# Patient Record
Sex: FEMALE | Race: WHITE | Employment: OTHER | URBAN - METROPOLITAN AREA
[De-identification: names, ages, dates, MRNs, and addresses within clinical notes are randomized per-mention and may not be internally consistent; named-entity substitution may affect disease eponyms.]

---

## 2020-05-05 ENCOUNTER — APPOINTMENT (OUTPATIENT)
Dept: GENERAL RADIOLOGY | Age: 45
End: 2020-05-05
Payer: COMMERCIAL

## 2020-05-05 ENCOUNTER — HOSPITAL ENCOUNTER (EMERGENCY)
Age: 45
Discharge: HOME | End: 2020-05-05
Attending: EMERGENCY MEDICINE
Payer: COMMERCIAL

## 2020-05-05 ENCOUNTER — APPOINTMENT (OUTPATIENT)
Dept: ULTRASOUND IMAGING | Age: 45
End: 2020-05-05
Payer: COMMERCIAL

## 2020-05-05 VITALS
BODY MASS INDEX: 46.01 KG/M2 | SYSTOLIC BLOOD PRESSURE: 135 MMHG | HEIGHT: 62 IN | TEMPERATURE: 98.4 F | WEIGHT: 250 LBS | DIASTOLIC BLOOD PRESSURE: 73 MMHG | HEART RATE: 90 BPM | OXYGEN SATURATION: 97 % | RESPIRATION RATE: 16 BRPM

## 2020-05-05 PROCEDURE — 73552 X-RAY EXAM OF FEMUR 2/>: CPT

## 2020-05-05 PROCEDURE — 93971 EXTREMITY STUDY: CPT

## 2020-05-05 PROCEDURE — 99284 EMERGENCY DEPT VISIT MOD MDM: CPT

## 2020-05-05 RX ORDER — ESCITALOPRAM OXALATE 20 MG/1
30 TABLET ORAL DAILY
COMMUNITY

## 2020-05-05 RX ORDER — CLONIDINE HYDROCHLORIDE 0.1 MG/1
0.1 TABLET ORAL 2 TIMES DAILY
COMMUNITY

## 2020-05-05 RX ORDER — PRAZOSIN HYDROCHLORIDE 5 MG/1
15 CAPSULE ORAL NIGHTLY
COMMUNITY

## 2020-05-05 RX ORDER — DIAZEPAM 5 MG/1
10 TABLET ORAL 3 TIMES DAILY
COMMUNITY
End: 2021-03-12 | Stop reason: ALTCHOICE

## 2020-05-05 RX ORDER — LAMOTRIGINE 200 MG/1
400 TABLET ORAL 2 TIMES DAILY
COMMUNITY

## 2020-05-05 RX ORDER — DOXEPIN HYDROCHLORIDE 100 MG/1
200 CAPSULE ORAL NIGHTLY
COMMUNITY

## 2020-05-05 ASSESSMENT — PAIN DESCRIPTION - LOCATION: LOCATION: LEG

## 2020-05-05 ASSESSMENT — PAIN SCALES - GENERAL: PAINLEVEL_OUTOF10: 8

## 2020-05-05 ASSESSMENT — PAIN DESCRIPTION - PAIN TYPE: TYPE: ACUTE PAIN

## 2020-05-05 ASSESSMENT — PAIN DESCRIPTION - ORIENTATION: ORIENTATION: RIGHT

## 2020-05-22 ENCOUNTER — HOSPITAL ENCOUNTER (EMERGENCY)
Age: 45
Discharge: HOME | End: 2020-05-22
Attending: EMERGENCY MEDICINE
Payer: COMMERCIAL

## 2020-05-22 VITALS
BODY MASS INDEX: 45.73 KG/M2 | RESPIRATION RATE: 18 BRPM | HEART RATE: 74 BPM | TEMPERATURE: 98 F | WEIGHT: 250 LBS | OXYGEN SATURATION: 98 % | DIASTOLIC BLOOD PRESSURE: 70 MMHG | SYSTOLIC BLOOD PRESSURE: 142 MMHG

## 2020-05-22 PROCEDURE — 99283 EMERGENCY DEPT VISIT LOW MDM: CPT

## 2020-05-22 PROCEDURE — 96372 THER/PROPH/DIAG INJ SC/IM: CPT

## 2020-05-22 PROCEDURE — 6360000002 HC RX W HCPCS: Performed by: STUDENT IN AN ORGANIZED HEALTH CARE EDUCATION/TRAINING PROGRAM

## 2020-05-22 RX ORDER — ORPHENADRINE CITRATE 30 MG/ML
60 INJECTION INTRAMUSCULAR; INTRAVENOUS ONCE
Status: COMPLETED | OUTPATIENT
Start: 2020-05-22 | End: 2020-05-22

## 2020-05-22 RX ADMIN — ORPHENADRINE CITRATE 60 MG: 30 INJECTION, SOLUTION INTRAMUSCULAR; INTRAVENOUS at 15:44

## 2020-05-23 ASSESSMENT — ENCOUNTER SYMPTOMS
VOMITING: 0
COUGH: 0
FACIAL SWELLING: 0
CHEST TIGHTNESS: 0
SHORTNESS OF BREATH: 0
BACK PAIN: 1
COLOR CHANGE: 0
NAUSEA: 0
PHOTOPHOBIA: 0
ABDOMINAL PAIN: 0
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
## 2020-09-12 ENCOUNTER — HOSPITAL ENCOUNTER (EMERGENCY)
Age: 45
Discharge: HOME | End: 2020-09-12
Attending: EMERGENCY MEDICINE
Payer: COMMERCIAL

## 2020-09-12 VITALS
TEMPERATURE: 97.7 F | RESPIRATION RATE: 16 BRPM | WEIGHT: 220 LBS | SYSTOLIC BLOOD PRESSURE: 130 MMHG | BODY MASS INDEX: 38.98 KG/M2 | DIASTOLIC BLOOD PRESSURE: 90 MMHG | HEART RATE: 99 BPM | HEIGHT: 63 IN | OXYGEN SATURATION: 98 %

## 2020-09-12 PROBLEM — J32.1 CHRONIC FRONTAL SINUSITIS: Status: ACTIVE | Noted: 2020-09-12

## 2020-09-12 PROBLEM — J02.9 ACUTE PHARYNGITIS: Status: ACTIVE | Noted: 2020-09-12

## 2020-09-12 PROCEDURE — 99284 EMERGENCY DEPT VISIT MOD MDM: CPT

## 2020-09-12 PROCEDURE — 99283 EMERGENCY DEPT VISIT LOW MDM: CPT

## 2020-09-12 PROCEDURE — 6370000000 HC RX 637 (ALT 250 FOR IP): Performed by: EMERGENCY MEDICINE

## 2020-09-12 RX ORDER — AMOXICILLIN AND CLAVULANATE POTASSIUM 875; 125 MG/1; MG/1
1 TABLET, FILM COATED ORAL 2 TIMES DAILY
Qty: 14 TABLET | Refills: 0 | Status: SHIPPED | OUTPATIENT
Start: 2020-09-12 | End: 2020-09-19

## 2020-09-12 RX ORDER — BACLOFEN 20 MG/1
20 TABLET ORAL PRN
COMMUNITY
End: 2021-10-19

## 2020-09-12 RX ORDER — AMOXICILLIN AND CLAVULANATE POTASSIUM 875; 125 MG/1; MG/1
1 TABLET, FILM COATED ORAL ONCE
Status: COMPLETED | OUTPATIENT
Start: 2020-09-12 | End: 2020-09-12

## 2020-09-12 RX ORDER — HYDROXYZINE 50 MG/1
50 TABLET, FILM COATED ORAL 3 TIMES DAILY PRN
COMMUNITY

## 2020-09-12 RX ADMIN — AMOXICILLIN AND CLAVULANATE POTASSIUM 1 TABLET: 875; 125 TABLET, FILM COATED ORAL at 18:58

## 2020-09-12 ASSESSMENT — PAIN DESCRIPTION - FREQUENCY: FREQUENCY: CONTINUOUS

## 2020-09-12 ASSESSMENT — ENCOUNTER SYMPTOMS
SORE THROAT: 1
BLOOD IN STOOL: 0
SINUS PAIN: 1
SINUS PRESSURE: 1
VOMITING: 0
NAUSEA: 0
BACK PAIN: 0
COUGH: 0
ABDOMINAL PAIN: 0
SHORTNESS OF BREATH: 0

## 2020-09-12 ASSESSMENT — PAIN DESCRIPTION - PROGRESSION: CLINICAL_PROGRESSION: GRADUALLY WORSENING

## 2020-09-12 ASSESSMENT — PAIN DESCRIPTION - LOCATION: LOCATION: THROAT

## 2020-09-12 ASSESSMENT — PAIN DESCRIPTION - DESCRIPTORS: DESCRIPTORS: ACHING

## 2020-09-12 ASSESSMENT — PAIN SCALES - GENERAL: PAINLEVEL_OUTOF10: 5

## 2020-09-12 ASSESSMENT — PAIN DESCRIPTION - PAIN TYPE: TYPE: ACUTE PAIN

## 2020-09-12 NOTE — ED PROVIDER NOTES
Sore throat and sinus pain today no vomiting no diarrhea drinking fluids urinating normally not pregnant no abdominal pain no stiff neck    The history is provided by the patient. Review of Systems   Constitutional: Negative for chills and fever. HENT: Positive for sinus pressure, sinus pain and sore throat. Respiratory: Negative for cough and shortness of breath. Cardiovascular: Negative for chest pain. Gastrointestinal: Negative for abdominal pain, blood in stool, nausea and vomiting. Genitourinary: Negative for dysuria and frequency. Musculoskeletal: Negative for back pain. Skin: Negative for rash. Neurological: Negative for weakness and headaches. All other systems reviewed and are negative. Physical Exam  Vitals signs and nursing note reviewed. Constitutional:       Appearance: She is well-developed. HENT:      Head: Normocephalic and atraumatic. Eyes:      Pupils: Pupils are equal, round, and reactive to light. Neck:      Musculoskeletal: Normal range of motion and neck supple. Cardiovascular:      Rate and Rhythm: Normal rate and regular rhythm. Pulmonary:      Effort: Pulmonary effort is normal. No respiratory distress. Breath sounds: Normal breath sounds. No wheezing or rales. Abdominal:      General: Bowel sounds are normal.      Palpations: Abdomen is soft. Tenderness: There is no abdominal tenderness. There is no guarding or rebound. Skin:     General: Skin is warm and dry. Neurological:      Mental Status: She is alert and oriented to person, place, and time. Cranial Nerves: No cranial nerve deficit.       Coordination: Coordination normal.     pop over sinuses    Procedures    [facility]        --------------------------------------------- PAST HISTORY ---------------------------------------------  Past Medical History:  has a past medical history of Anxiety, Bipolar 1 disorder ([address] Preeclampsia, and PTSD (post-traumatic stress disorder). Past Surgical History:  has a past surgical history that includes other surgical history. Social History:  reports that she has never smoked. She has never used smokeless tobacco. She reports current alcohol use. She reports that she does not use drugs. Family History: family history is not on file. The patients home medications have been reviewed. Allergies: Prednisone    -------------------------------------------------- RESULTS -------------------------------------------------  No results found for this visit on 09/12/20. No orders to display       ------------------------- NURSING NOTES AND VITALS REVIEWED ---------------------------   The nursing notes within the ED encounter and vital signs as below have been reviewed. BP (!) 130/90   Pulse 99   Temp 97.7 °F (36.5 °C) (Temporal)   Resp 16   Ht 5' 3\" (1.6 m)   Wt 220 lb (99.8 kg)   SpO2 98%   BMI 38.97 kg/m²   Oxygen Saturation Interpretation: Normal      ------------------------------------------ PROGRESS NOTES ------------------------------------------   I have spoken with the patient and discussed todays results, in addition to providing specific details for the plan of care and counseling regarding the diagnosis and prognosis. Their questions are answered at this time and they are agreeable with the plan.      --------------------------------- ADDITIONAL PROVIDER NOTES ---------------------------------          This patient is stable for discharge. I have shared the specific conditions for return, as well as the importance of follow-up.           --------------------------------- IMPRESSION AND DISPOSITION ---------------------------------    IMPRESSION  1. Acute pharyngitis, unspecified etiology    2. Chronic frontal sinusitis        DISPOSITION  Disposition: Discharge to home  Patient condition is stable        Labs      Radiology      EKG Interpretation.           Vanessa Yusuf MD  09/12/20 9077

## 2021-03-12 ENCOUNTER — HOSPITAL ENCOUNTER (EMERGENCY)
Age: 46
Discharge: HOME | End: 2021-03-12
Attending: EMERGENCY MEDICINE
Payer: COMMERCIAL

## 2021-03-12 VITALS
HEIGHT: 63 IN | HEART RATE: 87 BPM | SYSTOLIC BLOOD PRESSURE: 134 MMHG | RESPIRATION RATE: 16 BRPM | TEMPERATURE: 96.8 F | OXYGEN SATURATION: 99 % | DIASTOLIC BLOOD PRESSURE: 85 MMHG | BODY MASS INDEX: 38.98 KG/M2 | WEIGHT: 220 LBS
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
## 2021-03-12 PROCEDURE — 99283 EMERGENCY DEPT VISIT LOW MDM: CPT

## 2021-03-12 PROCEDURE — 6370000000 HC RX 637 (ALT 250 FOR IP): Performed by: EMERGENCY MEDICINE

## 2021-03-12 RX ORDER — DIAPER,BRIEF,INFANT-TODD,DISP
EACH MISCELLANEOUS ONCE
Status: COMPLETED | OUTPATIENT
Start: 2021-03-12 | End: 2021-03-12

## 2021-03-12 RX ORDER — CLONAZEPAM 2 MG/1
2 TABLET, ORALLY DISINTEGRATING ORAL 4 TIMES DAILY
COMMUNITY
End: 2022-08-03

## 2021-03-12 RX ORDER — DOXYCYCLINE HYCLATE 100 MG/1
100 CAPSULE ORAL ONCE
Status: COMPLETED | OUTPATIENT
Start: 2021-03-12 | End: 2021-03-12

## 2021-03-12 RX ORDER — MUPIROCIN CALCIUM 20 MG/G
CREAM TOPICAL
Qty: 15 G | Refills: 0 | Status: SHIPPED | OUTPATIENT
Start: 2021-03-12 | End: 2021-04-11

## 2021-03-12 RX ORDER — DOXYCYCLINE HYCLATE 100 MG
100 TABLET ORAL 2 TIMES DAILY
Qty: 14 TABLET | Refills: 0 | Status: SHIPPED | OUTPATIENT
Start: 2021-03-12 | End: 2021-03-19

## 2021-03-12 RX ADMIN — BACITRACIN ZINC: 500 OINTMENT TOPICAL at 22:29

## 2021-03-12 RX ADMIN — DOXYCYCLINE HYCLATE 100 MG: 100 CAPSULE ORAL at 22:28

## 2021-03-12 ASSESSMENT — PAIN DESCRIPTION - FREQUENCY: FREQUENCY: CONTINUOUS

## 2021-03-12 ASSESSMENT — PAIN DESCRIPTION - ORIENTATION: ORIENTATION: LEFT

## 2021-03-13 NOTE — ED PROVIDER NOTES
HPI:  3/13/21,   Time: 4:05 AM ZHANNA Cabrera is a 39 y.o. female presenting to the ED for insect/tick bite left breast area, beginning prior to arrival ago. The complaint has been persistent, moderate in severity, and worsened by nothing.  removed the tick. Puncture wound is noted in the left breast area. No fever no chills. ROS:   Pertinent positives and negatives are stated within HPI, all other systems reviewed and are negative.  --------------------------------------------- PAST HISTORY ---------------------------------------------  Past Medical History:  has a past medical history of Anxiety, Bipolar 1 disorder ([address] Preeclampsia, and PTSD (post-traumatic stress disorder). Past Surgical History:  has a past surgical history that includes other surgical history. Social History:  reports that she has never smoked. She has never used smokeless tobacco. She reports current alcohol use. She reports that she does not use drugs. Family History: family history is not on file. The patients home medications have been reviewed. Allergies: Prednisone    -------------------------------------------------- RESULTS -------------------------------------------------  All laboratory and radiology results have been personally reviewed by myself   LABS:  No results found for this visit on 03/12/21. RADIOLOGY:  Interpreted by Radiologist.  No orders to display       ------------------------- NURSING NOTES AND VITALS REVIEWED ---------------------------   The nursing notes within the ED encounter and vital signs as below have been reviewed.    /85   Pulse 87   Temp 96.8 °F (36 °C) (Temporal)   Resp 16   Ht 5' 3\" (1.6 m)   Wt 220 lb (99.8 kg)   SpO2 99%   BMI 38.97 kg/m²   Oxygen Saturation Interpretation: Normal      ---------------------------------------------------PHYSICAL EXAM--------------------------------------      Constitutional/General: Alert and oriented x3, well appearing, non toxic in NAD  Head: NC/AT  Eyes: PERRL, EOMI  Mouth: Oropharynx clear, handling secretions, no trismus  Neck: Supple, full ROM, no meningeal signs  Pulmonary: Lungs clear to auscultation bilaterally, no wheezes, rales, or rhonchi. Not in respiratory distress  Cardiovascular:  Regular rate and rhythm, no murmurs, gallops, or rubs. 2+ distal pulses    Chest shows small puncture wound noted just outside the areola and the position of approximately 11:00. There is slight redness there there is a puncture wound noted. There is no insect or foreign body noted there at this time. There is no drainage. There is some slight redness. Abdomen: Soft, non tender, non distended,   Extremities: Moves all extremities x 4. Warm and well perfused  Skin: warm and dry without rash  Neurologic: GCS 15,  Psych: Normal Affect      ------------------------------ ED COURSE/MEDICAL DECISION MAKING----------------------  Medications   doxycycline hyclate (VIBRAMYCIN) capsule 100 mg (100 mg Oral Given 3/12/21 2228)   bacitracin zinc ointment ( Topical Given 3/12/21 2229)         Medical Decision Making:    Exam with a chaperone and discharge with prophylaxis for Lyme disease, doxycycline. Counseling: The emergency provider has spoken with the patient and discussed todays results, in addition to providing specific details for the plan of care and counseling regarding the diagnosis and prognosis. Questions are answered at this time and they are agreeable with the plan.      --------------------------------- IMPRESSION AND DISPOSITION ---------------------------------    IMPRESSION  1.  Tick bite, initial encounter        DISPOSITION  Disposition: Discharge to home  Patient condition is fair                  Gerry Dickey MD  03/13/21 9897

## 2021-03-13 NOTE — ED NOTES
Redness noted near left nipple. Tender to palpation. No drainage noted.       Noa Chicas RN  03/12/21 0298

## 2021-03-13 NOTE — ED NOTES
Pt states bitten on left breast area-  DAWOOD Calero deferred assessment.   To follow with pmd  rx x2 given     Manuela Doll RN  03/12/21 7147

## 2021-10-19 ENCOUNTER — HOSPITAL ENCOUNTER (EMERGENCY)
Age: 46
Discharge: HOME | End: 2021-10-19
Attending: EMERGENCY MEDICINE
Payer: MEDICARE

## 2021-10-19 ENCOUNTER — APPOINTMENT (OUTPATIENT)
Dept: GENERAL RADIOLOGY | Age: 46
End: 2021-10-19
Payer: MEDICARE

## 2021-10-19 VITALS
OXYGEN SATURATION: 98 % | WEIGHT: 230 LBS | HEART RATE: 83 BPM | DIASTOLIC BLOOD PRESSURE: 90 MMHG | BODY MASS INDEX: 40.75 KG/M2 | SYSTOLIC BLOOD PRESSURE: 130 MMHG | HEIGHT: 63 IN | TEMPERATURE: 97.4 F | RESPIRATION RATE: 18 BRPM

## 2021-10-19 DIAGNOSIS — S39.012A STRAIN OF LUMBAR REGION, INITIAL ENCOUNTER: Primary | ICD-10-CM

## 2021-10-19 DIAGNOSIS — M62.830 LUMBAR PARASPINAL MUSCLE SPASM: ICD-10-CM

## 2021-10-19 DIAGNOSIS — M47.816 SPONDYLOSIS OF LUMBAR REGION WITHOUT MYELOPATHY OR RADICULOPATHY: ICD-10-CM

## 2021-10-19 PROCEDURE — 72100 X-RAY EXAM L-S SPINE 2/3 VWS: CPT

## 2021-10-19 PROCEDURE — 99283 EMERGENCY DEPT VISIT LOW MDM: CPT

## 2021-10-19 PROCEDURE — 6360000002 HC RX W HCPCS: Performed by: EMERGENCY MEDICINE

## 2021-10-19 PROCEDURE — 96372 THER/PROPH/DIAG INJ SC/IM: CPT

## 2021-10-19 RX ORDER — CYCLOBENZAPRINE HCL 10 MG
10 TABLET ORAL 2 TIMES DAILY PRN
Qty: 20 TABLET | Refills: 0 | Status: SHIPPED | OUTPATIENT
Start: 2021-10-19 | End: 2022-05-19

## 2021-10-19 RX ORDER — KETOROLAC TROMETHAMINE 30 MG/ML
30 INJECTION, SOLUTION INTRAMUSCULAR; INTRAVENOUS ONCE
Status: COMPLETED | OUTPATIENT
Start: 2021-10-19 | End: 2021-10-19

## 2021-10-19 RX ORDER — IBUPROFEN 800 MG/1
800 TABLET ORAL EVERY 8 HOURS PRN
Qty: 12 TABLET | Refills: 0 | Status: SHIPPED | OUTPATIENT
Start: 2021-10-19 | End: 2022-02-08

## 2021-10-19 RX ORDER — TRIAMCINOLONE ACETONIDE 40 MG/ML
40 INJECTION, SUSPENSION INTRA-ARTICULAR; INTRAMUSCULAR ONCE
Status: COMPLETED | OUTPATIENT
Start: 2021-10-19 | End: 2021-10-19

## 2021-10-19 RX ORDER — METHYLPREDNISOLONE 4 MG/1
TABLET ORAL
Qty: 1 KIT | Refills: 0 | Status: SHIPPED | OUTPATIENT
Start: 2021-10-19 | End: 2021-10-25

## 2021-10-19 RX ADMIN — KETOROLAC TROMETHAMINE 30 MG: 30 INJECTION, SOLUTION INTRAMUSCULAR at 08:40

## 2021-10-19 RX ADMIN — TRIAMCINOLONE ACETONIDE 40 MG: 40 INJECTION, SUSPENSION INTRA-ARTICULAR; INTRAMUSCULAR at 08:40

## 2021-10-19 ASSESSMENT — PAIN DESCRIPTION - LOCATION: LOCATION: BACK

## 2021-10-19 ASSESSMENT — PAIN DESCRIPTION - DESCRIPTORS: DESCRIPTORS: CONSTANT

## 2021-10-19 ASSESSMENT — PAIN SCALES - GENERAL
PAINLEVEL_OUTOF10: 10
PAINLEVEL_OUTOF10: 7

## 2021-10-19 ASSESSMENT — PAIN DESCRIPTION - FREQUENCY: FREQUENCY: CONTINUOUS

## 2021-10-19 NOTE — ED NOTES
Radiology Procedure Waiver   Name: Demetrius Landers  : 1975  MRN: [id number]    Date:  10/19/21    Time: 8:38 AM EDT    Benefits of immediately proceeding with Radiology exam(s) without pre-testing outweigh the risks or are not indicated as specified below and therefore the following is/are being waived:    [x] Pregnancy test   [] Patients LMP on-time and regular. [x] Patient had Tubal Ligation or has other Contraception Device. [] Patient  is Menopausal or Premenarcheal.    [] Patient had Full or Partial Hysterectomy. [] Protocol for Iodine allergy    [] MRI Questionnaire     [] BUN/Creatinine   [] Patient age w/no hx of renal dysfunction. [] Patient on Dialysis. [] Recent Normal Labs.   Electronically signed by Taylor Purdy DO on 10/19/21 at 8:38 AM EDT               Taylor Purdy DO  10/19/21 193

## 2021-10-19 NOTE — ED PROVIDER NOTES
HPI:  10/19/21, Time: 8:04 AM EDT         Elana Rinne is a 42 y.o. female presenting to the ED for right lower lumbar pain and muscle spasm, beginning yesterday after doing some heavy housework (put up the [employer] tree, sweeping, heavy box moving). As direct trauma or injury, she denies midline back pain but states that she does have history of chronic low back pain which is now exacerbated. She denies focal paresthesias, focal weakness, saddle paresthesias, urinary or stool incontinence or retention, fever/chills. The complaint has been persistent, moderate in severity, and worsened by nothing. Prefers flexeril and motrin, also prefers no prednisone but tolerates other steroids (prednisone makes her angry) . Patient denies fever/chills, sore throat, cough, congestion, chest pain, shortness of breath, edema, headache, visual disturbances, focal paresthesias, focal weakness, abdominal pain, nausea, vomiting, diarrhea, constipation, dysuria, hematuria, trauma, neck, rash or other complaints. She did drive here. ROS:   A complete review of systems was performed and all pertinent positives and negatives are stated within HPI, all other systems reviewed and are negative.      --------------------------------------------- PAST HISTORY ---------------------------------------------  Past Medical History:  has a past medical history of Anxiety, Bipolar 1 disorder ([facility] [address] Preeclampsia, and PTSD (post-traumatic stress disorder). Past Surgical History:  has a past surgical history that includes other surgical history. Social History:  reports that she has never smoked. She has never used smokeless tobacco. She reports current alcohol use. She reports that she does not use drugs. Family History: family history is not on file. The patients home medications have been reviewed.     Allergies: Prednisone        ----------------------------------------PHYSICAL EXAM--------------------------------------  Constitutional:  Well developed, well nourished, obese, no acute distress, non-toxic appearance   Eyes:  PERRL, conjunctiva normal, EOMI  HENT:  Atraumatic, external ears normal, nose normal, oropharynx moist. Neck- normal range of motion, no nuchal rigidity   Respiratory:  No respiratory distress, normal breath sounds, no rales, no wheezing   Cardiovascular:  Normal rate, normal rhythm, no murmurs, no gallops, no rubs. Radial and DP pulses 2+ bilaterally. Compartments are soft. She is warm and well-perfused. Cap refill less than 3 seconds. GI:  Soft, nondistended, normal bowel sounds, nontender, no organomegaly, no mass, no rebound, no guarding   :  No costovertebral angle tenderness   Musculoskeletal:  No edema, no tenderness, no deformities. Back-no midline or paraspinal cervical or thoracic tenderness. No midline lumbar tenderness. Patient has palpable right-sided lumbar paraspinal muscle spasm and prefers to sit leaning to the right. Chest stable. Pelvis stable. Moves all 4 extremities without difficulty or pain. Integument:  Well hydrated, no visible rash. Adequate perfusion. Neurologic:  Alert & oriented x 3, CN 2-12 normal, no focal deficits noted. DTRs 2+ bilateral patellar. Normal gait. Psychiatric:  Speech and behavior appropriate       -------------------------------------------------- RESULTS -------------------------------------------------  I have personally reviewed all laboratory and imaging results for this patient. Results are listed below. LABS:  No results found for this visit on 10/19/21. RADIOLOGY:  Interpreted by Radiologist.  XR LUMBAR SPINE (2-3 VIEWS)   Final Result   Significant degenerative disc disease noted at the L4-5 and L5-S1 levels.       There is no acute abnormality of the lumbar spine.               ------------------------- NURSING NOTES AND VITALS REVIEWED ---------------------------  The nursing notes within the ED encounter and vital signs as below have been reviewed by myself. BP (!) 130/90   Pulse 83   Temp 97.4 °F (36.3 °C)   Resp 18   Ht 5' 3\" (1.6 m)   Wt 230 lb (104.3 kg)   SpO2 98%   BMI 40.74 kg/m²   Oxygen Saturation Interpretation: Normal      The patients available past medical records and past encounters were reviewed. ------------------------------ ED COURSE/MEDICAL DECISION MAKING----------------------  Medications   triamcinolone acetonide (KENALOG-40) injection 40 mg (40 mg IntraMUSCular Given 10/19/21 0840)   ketorolac (TORADOL) injection 30 mg (30 mg IntraMUSCular Given 10/19/21 0840)           Procedures:   none      Medical Decision Making:    Lumbar spine DJD - patient already aware of this. She is improving in the ER with treatment. She will be discharged home on Flexeril, safe use and driving restrictions reviewed, Medrol Dosepak and Motrin per her request.  She is neurovascularly intact and ambulatory upon discharge. Patient was explicitly instructed on specific signs and symptoms on which to return to the emergency room for. Patient was instructed to return to the ER for any new or worsening symptoms. Additional discharge instructions were given verbally. All questions were answered. Patient is comfortable and agreeable with discharge plan. Patient in no acute distress and non-toxic in appearance. This patient's ED course included: re-evaluation prior to disposition and a personal history and physicial eaxmination    This patient has remained hemodynamically stable and improved during their ED course. Re-Evaluations:  Time: 9:32 AM EDT   Re-evaluation. Patients symptoms are improving  Repeat physical examination is not changed      Consultations:   none    Critical Care: none    Krystle LANDEROS, DO, am the Primary Provider of Record    Counseling:   The emergency provider has spoken with the patient and discussed todays results, in addition to providing specific details for the plan of care and counseling regarding the diagnosis and prognosis. Questions are answered at this time and they are agreeable with the plan.    --------------------------- IMPRESSION AND DISPOSITION ---------------------------------    IMPRESSION  1. Strain of lumbar region, initial encounter    2. Lumbar paraspinal muscle spasm    3.  Spondylosis of lumbar region without myelopathy or radiculopathy        DISPOSITION  Disposition: Discharge to home  Patient condition is stable             Jessica Coyne DO  10/19/21 1014

## 2022-02-08 ENCOUNTER — HOSPITAL ENCOUNTER (EMERGENCY)
Age: 47
Discharge: HOME | End: 2022-02-08
Payer: MEDICARE

## 2022-02-08 ENCOUNTER — APPOINTMENT (OUTPATIENT)
Dept: GENERAL RADIOLOGY | Age: 47
End: 2022-02-08
Payer: MEDICARE

## 2022-02-08 VITALS
WEIGHT: 230 LBS | OXYGEN SATURATION: 99 % | TEMPERATURE: 96.7 F | HEART RATE: 87 BPM | RESPIRATION RATE: 16 BRPM | DIASTOLIC BLOOD PRESSURE: 93 MMHG | BODY MASS INDEX: 40.74 KG/M2 | SYSTOLIC BLOOD PRESSURE: 146 MMHG

## 2022-02-08 DIAGNOSIS — M77.31 CALCANEAL SPUR OF RIGHT FOOT: Primary | ICD-10-CM

## 2022-02-08 DIAGNOSIS — M76.61 ACHILLES TENDINITIS OF RIGHT LOWER EXTREMITY: ICD-10-CM

## 2022-02-08 DIAGNOSIS — M79.671 PAIN OF RIGHT HEEL: ICD-10-CM

## 2022-02-08 DIAGNOSIS — M79.671 RIGHT FOOT PAIN: ICD-10-CM

## 2022-02-08 PROCEDURE — 6370000000 HC RX 637 (ALT 250 FOR IP): Performed by: PHYSICIAN ASSISTANT

## 2022-02-08 PROCEDURE — 73630 X-RAY EXAM OF FOOT: CPT

## 2022-02-08 PROCEDURE — 99283 EMERGENCY DEPT VISIT LOW MDM: CPT

## 2022-02-08 RX ORDER — HYDROCODONE BITARTRATE AND ACETAMINOPHEN 5; 325 MG/1; MG/1
1 TABLET ORAL ONCE
Status: COMPLETED | OUTPATIENT
Start: 2022-02-08 | End: 2022-02-08

## 2022-02-08 RX ORDER — HYDROCODONE BITARTRATE AND ACETAMINOPHEN 5; 325 MG/1; MG/1
1 TABLET ORAL EVERY 6 HOURS PRN
Qty: 12 TABLET | Refills: 0 | Status: SHIPPED | OUTPATIENT
Start: 2022-02-08 | End: 2022-02-11

## 2022-02-08 RX ADMIN — HYDROCODONE BITARTRATE AND ACETAMINOPHEN 1 TABLET: 5; 325 TABLET ORAL at 13:48

## 2022-02-08 ASSESSMENT — PAIN DESCRIPTION - ONSET: ONSET: SUDDEN

## 2022-02-08 ASSESSMENT — PAIN SCALES - GENERAL
PAINLEVEL_OUTOF10: 6
PAINLEVEL_OUTOF10: 6

## 2022-02-08 ASSESSMENT — PAIN DESCRIPTION - DESCRIPTORS: DESCRIPTORS: SORE;TENDER

## 2022-02-08 ASSESSMENT — PAIN DESCRIPTION - FREQUENCY: FREQUENCY: CONTINUOUS

## 2022-02-08 ASSESSMENT — PAIN DESCRIPTION - ORIENTATION: ORIENTATION: RIGHT

## 2022-02-08 ASSESSMENT — PAIN DESCRIPTION - LOCATION: LOCATION: FOOT

## 2022-02-08 ASSESSMENT — PAIN DESCRIPTION - PAIN TYPE: TYPE: ACUTE PAIN

## 2022-02-08 ASSESSMENT — PAIN DESCRIPTION - PROGRESSION: CLINICAL_PROGRESSION: GRADUALLY WORSENING

## 2022-02-08 NOTE — ED PROVIDER NOTES
Independent:    HPI:  2/8/22, Time: 12:08 PM ZHANNA Bates is a 42 y.o. female presenting to the ED for right heel pain, beginning 1 week ago. The patient is unsure of specific injury. She reports that the only thing that she has done differently is she has begun running on the treadmill vs walking. She does wear a good supportive shoe. She reports much pain on attempts at walking or bearing weight on her right foot and palpation of her right heel area. She states that she has never had any problems with a heel spur, etc.. in the past.   The complaint has been persistent, moderate to severe and worsened by weight bearing and local palpation. Review of Systems:   A complete review of systems was performed and pertinent positives and negatives are stated within HPI, all other systems reviewed and are negative.    --------------------------------------------- PAST HISTORY ---------------------------------------------  Past Medical History:  has a past medical history of Anxiety, Bipolar 1 disorder ([address] Preeclampsia, and PTSD (post-traumatic stress disorder). Past Surgical History:  has a past surgical history that includes other surgical history. Social History:  reports that she has never smoked. She has never used smokeless tobacco. She reports current alcohol use. She reports that she does not use drugs. Family History: family history is not on file. The patients home medications have been reviewed. Allergies: Prednisone    -------------------------------------------------- RESULTS -------------------------------------------------  All laboratory and radiology results have been personally reviewed by myself   LABS:  No results found for this visit on 02/08/22.     RADIOLOGY:  Interpreted by Radiologist.  XR FOOT RIGHT (MIN 3 VIEWS)   Final Result   No acute osseous findings in right foot             ------------------------- NURSING NOTES AND VITALS REVIEWED ---------------------------   The nursing notes within the ED encounter and vital signs as below have been reviewed. BP (!) 146/93   Pulse 87   Temp 96.7 °F (35.9 °C)   Resp 16   Wt 230 lb (104.3 kg)   SpO2 99%   BMI 40.74 kg/m²   Oxygen Saturation Interpretation: Normal      ---------------------------------------------------PHYSICAL EXAM--------------------------------------      Constitutional/General: Alert and oriented x3, well appearing, non toxic in NAD  Head: Normocephalic and atraumatic  Eyes: PERRL, EOMI  Mouth: Oropharynx clear  Neck: Supple, full ROM,   Pulmonary: Lungs clear to auscultation bilaterally. Not in respiratory distress  Cardiovascular:  Regular rate and rhythm, 2+ distal pulses  Extremities: Moves all extremities x 4. Local tenderness to right posterior heel at/around achilles, no visible erythema. Achilles reflex intact, no visible STS or visible area of fluid noted. No tenderness to dorsal aspect of right foot, DP and PT intact and strong. Sensory intact to all digits of right foot, capillary refill brisk. No tenderness to medial or lateral aspect of right ankle. Warm and well perfused  Skin: warm and dry without rash  Neurologic: GCS 15  Psych: Normal Affect      ------------------------------ ED COURSE/MEDICAL DECISION MAKING----------------------  Medications   HYDROcodone-acetaminophen (NORCO) 5-325 MG per tablet 1 tablet (1 tablet Oral Given 2/8/22 1348)         ED COURSE:       Medical Decision Making:    Patient to ER, complaints of right posterior heel, onset over the last 1 week, unsure of any injury. Patient underwent xrays in ER. Radiology read with no obvious abnormality noted. Patient offered Prednisone, but patient reports unable to tolerate oral prednisone due to side effects. Patient advised will continue to wear the shoe she has did not want to opt for any type of orthotic boot or post op shoe or wrap as pressure on the area causes her increased pain.    Will provide her small Rx for Norco for pain. On reviewing xray, noted small spur to posterior heel. Patient notified via phone after visit and advised that this was noted. Patient advised will send in Rx for Voltaren gel topically as well as recommend to continue to ice, recommend to rest and avoid treadmill and keep appointment with podiatry as scheduled. Recommend to get xray disc if she desires to take to appointment, but they most likely will repeat her imaging. Counseling: The emergency provider has spoken with the patient and discussed todays results, in addition to providing specific details for the plan of care and counseling regarding the diagnosis and prognosis. Questions are answered at this time and they are agreeable with the plan.      --------------------------------- IMPRESSION AND DISPOSITION ---------------------------------    IMPRESSION  1. Right foot pain    2. Pain of right heel    3. Achilles tendinitis of right lower extremity    4. Calcaneal spur of right foot        DISPOSITION  Disposition: Discharge to home  Patient condition is stable      NOTE: This report was transcribed using voice recognition software.  Every effort was made to ensure accuracy; however, inadvertent computerized transcription errors may be present       Attila Rosen PA-C  02/09/22 0091

## 2022-05-19 ENCOUNTER — APPOINTMENT (OUTPATIENT)
Dept: GENERAL RADIOLOGY | Age: 47
End: 2022-05-19
Payer: MEDICARE

## 2022-05-19 ENCOUNTER — HOSPITAL ENCOUNTER (EMERGENCY)
Age: 47
Discharge: HOME | End: 2022-05-19
Attending: STUDENT IN AN ORGANIZED HEALTH CARE EDUCATION/TRAINING PROGRAM
Payer: MEDICARE

## 2022-05-19 VITALS
TEMPERATURE: 99.1 F | WEIGHT: 224 LBS | DIASTOLIC BLOOD PRESSURE: 90 MMHG | OXYGEN SATURATION: 97 % | BODY MASS INDEX: 39.68 KG/M2 | SYSTOLIC BLOOD PRESSURE: 143 MMHG | HEART RATE: 95 BPM | RESPIRATION RATE: 18 BRPM

## 2022-05-19 DIAGNOSIS — M54.50 ACUTE BILATERAL LOW BACK PAIN WITHOUT SCIATICA: Primary | ICD-10-CM
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
## 2022-05-19 LAB — HCG(URINE) PREGNANCY TEST: NEGATIVE

## 2022-05-19 PROCEDURE — 96372 THER/PROPH/DIAG INJ SC/IM: CPT

## 2022-05-19 PROCEDURE — 81025 URINE PREGNANCY TEST: CPT

## 2022-05-19 PROCEDURE — 99284 EMERGENCY DEPT VISIT MOD MDM: CPT

## 2022-05-19 PROCEDURE — 6360000002 HC RX W HCPCS: Performed by: STUDENT IN AN ORGANIZED HEALTH CARE EDUCATION/TRAINING PROGRAM

## 2022-05-19 PROCEDURE — 72110 X-RAY EXAM L-2 SPINE 4/>VWS: CPT

## 2022-05-19 PROCEDURE — 73521 X-RAY EXAM HIPS BI 2 VIEWS: CPT

## 2022-05-19 RX ORDER — ORPHENADRINE CITRATE 30 MG/ML
60 INJECTION INTRAMUSCULAR; INTRAVENOUS ONCE
Status: COMPLETED | OUTPATIENT
Start: 2022-05-19 | End: 2022-05-19

## 2022-05-19 RX ORDER — CYCLOBENZAPRINE HCL 10 MG
10 TABLET ORAL 3 TIMES DAILY PRN
Qty: 21 TABLET | Refills: 0 | Status: SHIPPED | OUTPATIENT
Start: 2022-05-19 | End: 2022-05-29

## 2022-05-19 RX ORDER — IBUPROFEN 600 MG/1
600 TABLET ORAL 3 TIMES DAILY PRN
Qty: 30 TABLET | Refills: 0 | Status: SHIPPED | OUTPATIENT
Start: 2022-05-19

## 2022-05-19 RX ORDER — KETOROLAC TROMETHAMINE 30 MG/ML
30 INJECTION, SOLUTION INTRAMUSCULAR; INTRAVENOUS ONCE
Status: COMPLETED | OUTPATIENT
Start: 2022-05-19 | End: 2022-05-19

## 2022-05-19 RX ADMIN — ORPHENADRINE CITRATE 60 MG: 30 INJECTION INTRAMUSCULAR; INTRAVENOUS at 18:35

## 2022-05-19 RX ADMIN — KETOROLAC TROMETHAMINE 30 MG: 30 INJECTION, SOLUTION INTRAMUSCULAR; INTRAVENOUS at 18:35

## 2022-05-19 ASSESSMENT — PAIN DESCRIPTION - LOCATION: LOCATION: BACK

## 2022-05-19 ASSESSMENT — ENCOUNTER SYMPTOMS
FACIAL SWELLING: 0
SHORTNESS OF BREATH: 0
COUGH: 0
VOMITING: 0
ABDOMINAL PAIN: 0
DIARRHEA: 0
NAUSEA: 0
BACK PAIN: 1
TROUBLE SWALLOWING: 0

## 2022-05-19 ASSESSMENT — PAIN DESCRIPTION - DESCRIPTORS: DESCRIPTORS: DULL;TIGHTNESS

## 2022-05-19 ASSESSMENT — PAIN - FUNCTIONAL ASSESSMENT: PAIN_FUNCTIONAL_ASSESSMENT: 0-10

## 2022-05-19 ASSESSMENT — PAIN SCALES - GENERAL: PAINLEVEL_OUTOF10: 8

## 2022-05-19 NOTE — ED PROVIDER NOTES
Chief Complaint   Patient presents with    Back Pain     @3pm back injured after dog knocked patient over running for toy. Patient is a 44-year-old female presents today for back pain. She states around 3 PM this morning her dog ran up the steps knocking her over. She fell backwards onto her buttocks. She did have pain immediately in the buttock and lower lumbar region. She did not hit her head or lose consciousness. She is not on blood thinning medications. She is not tried a medication for the symptoms. Symptoms are worse when she is up moving around and relieved with rest.  She denies numbness, tingling or weakness in her lower extremity. Denies headache or blurry vision. On arrival she is alert and oriented. She has been ambulatory since the event this afternoon. She has no other complaints at this time other than pain in the lumbar region which does not radiate. She denies red flag symptoms that be concerning for cauda equina, no saddle anesthesia, no urinary or bowel incontinence or retention. The history is provided by the patient. No  was used. Review of Systems   Constitutional: Negative for chills, diaphoresis and fever. HENT: Negative for facial swelling and trouble swallowing. Eyes: Negative for visual disturbance. Respiratory: Negative for cough and shortness of breath. Cardiovascular: Negative for chest pain. Gastrointestinal: Negative for abdominal pain, diarrhea, nausea and vomiting. Musculoskeletal: Positive for back pain. Negative for gait problem, joint swelling, myalgias, neck pain and neck stiffness. Skin: Negative for wound. Neurological: Negative for dizziness, syncope, facial asymmetry, weakness and numbness. Hematological: Negative for adenopathy. Psychiatric/Behavioral: Negative for behavioral problems and confusion. Physical Exam  Vitals and nursing note reviewed.    Constitutional:       General: She is not in acute distress. Appearance: Normal appearance. She is well-developed. She is not ill-appearing, toxic-appearing or diaphoretic. HENT:      Head: Normocephalic and atraumatic. Eyes:      Pupils: Pupils are equal, round, and reactive to light. Cardiovascular:      Rate and Rhythm: Normal rate and regular rhythm. Pulses: Normal pulses. Heart sounds: Normal heart sounds. Pulmonary:      Effort: Pulmonary effort is normal. No respiratory distress. Breath sounds: Normal breath sounds. No wheezing or rales. Abdominal:      General: Bowel sounds are normal.      Palpations: Abdomen is soft. Tenderness: There is no abdominal tenderness. There is no guarding or rebound. Musculoskeletal:         General: Tenderness present. Cervical back: Normal range of motion and neck supple. No rigidity or tenderness. Comments: No tenderness to a patient of the cervical or thoracic spine, tenderness palpation of the paraspinal musculature of the lumbar region to the right side, no step-offs noted  No bruising or ecchymosis   Skin:     General: Skin is warm and dry. Capillary Refill: Capillary refill takes less than 2 seconds. Neurological:      General: No focal deficit present. Mental Status: She is alert and oriented to person, place, and time. Cranial Nerves: No cranial nerve deficit. Sensory: No sensory deficit. Motor: No weakness. Coordination: Coordination normal.      Gait: Gait normal.      Comments: GCs 15  Muscle strength 5/5 in upper and lower extremities bilaterally  Sensation intact to light touch in upper and lower extremities bilaterally  Alert and oriented x3            Procedures     Labs Reviewed   PREGNANCY, URINE     XR HIP BILATERAL W AP PELVIS (2 VIEWS)   Final Result   1. Diffuse degenerative disc disease throughout the lumbar spine with   multilevel degenerative joint disease and no acute bony abnormality.    2. No acute bony abnormality or significant degenerative change about the   right or left hip. XR LUMBAR SPINE (MIN 4 VIEWS)   Final Result   1. Diffuse degenerative disc disease throughout the lumbar spine with   multilevel degenerative joint disease and no acute bony abnormality. 2. No acute bony abnormality or significant degenerative change about the   right or left hip. MDM  Number of Diagnoses or Management Options  Acute bilateral low back pain without sciatica  Fall from ground level  Diagnosis management comments: Patient is a 72-year-old female presents today with back pain after being knocked over by her dog. She fell on her backside. She did not hit her head or lose consciousness. On arrival GCS 15, she is neurovascularly and neurologically intact. She has midline tenderness in the lumbar region and paraspinal tenderness. X-rays show no acute fracture, they do note chronic degenerative changes of lumbar spine which patient is aware of and has history of. She was given medication in the department with improvement of her symptoms. As she is able to ambulate, she has no other neurologic or neurovascular deficits, has no red flag symptoms or be concerning for cauda equina she will be discharged home with medication and instructed to follow-up with her PCP. Return precautions given. Vitals are stable for discharge home. Amount and/or Complexity of Data Reviewed  Clinical lab tests: reviewed  Tests in the radiology section of CPT®: reviewed           ED Course as of 05/19/22 1935   Thu May 19, 2022   1908 Pain is improved. She has been able to ambulate. []      ED Course User Index  [] Debbie Khan DO       --------------------------------------------- PAST HISTORY ---------------------------------------------  Past Medical History:  has a past medical history of Anxiety, Bipolar 1 disorder ([address] Preeclampsia, and PTSD (post-traumatic stress disorder).     Past Surgical History:  has a past surgical history that includes other surgical history. Social History:  reports that she has never smoked. She has never used smokeless tobacco. She reports current alcohol use. She reports that she does not use drugs. Family History: family history is not on file. The patients home medications have been reviewed. Allergies: Prednisone    -------------------------------------------------- RESULTS -------------------------------------------------  Labs:  Results for orders placed or performed during the hospital encounter of 05/19/22   Pregnancy, Urine   Result Value Ref Range    HCG(Urine) Pregnancy Test NEGATIVE NEGATIVE       Radiology:  XR HIP BILATERAL W AP PELVIS (2 VIEWS)   Final Result   1. Diffuse degenerative disc disease throughout the lumbar spine with   multilevel degenerative joint disease and no acute bony abnormality. 2. No acute bony abnormality or significant degenerative change about the   right or left hip. XR LUMBAR SPINE (MIN 4 VIEWS)   Final Result   1. Diffuse degenerative disc disease throughout the lumbar spine with   multilevel degenerative joint disease and no acute bony abnormality. 2. No acute bony abnormality or significant degenerative change about the   right or left hip.             ------------------------- NURSING NOTES AND VITALS REVIEWED ---------------------------  Date / Time Roomed:  5/19/2022  5:43 PM  ED Bed Assignment:  HIGGINS F/HIGGINS-F    The nursing notes within the ED encounter and vital signs as below have been reviewed.    BP (!) 143/90   Pulse 95   Temp 99.1 °F (37.3 °C) (Temporal)   Resp 18   Wt 224 lb (101.6 kg)   LMP 05/19/2021   SpO2 97%   BMI 39.68 kg/m²   Oxygen Saturation Interpretation: Normal      ------------------------------------------ PROGRESS NOTES ------------------------------------------  I have spoken with the patient and discussed todays results, in addition to providing specific details for the plan of care and counseling regarding the diagnosis and prognosis. Their questions are answered at this time and they are agreeable with the plan. I discussed at length with them reasons for immediate return here for re evaluation. They will followup with primary care by calling their office tomorrow. --------------------------------- ADDITIONAL PROVIDER NOTES ---------------------------------  At this time the patient is without objective evidence of an acute process requiring hospitalization or inpatient management. They have remained hemodynamically stable throughout their entire ED visit and are stable for discharge with outpatient follow-up. The plan has been discussed in detail and they are aware of the specific conditions for emergent return, as well as the importance of follow-up. Discharge Medication List as of 5/19/2022  7:10 PM      START taking these medications    Details   ibuprofen (ADVIL;MOTRIN) 600 MG tablet Take 1 tablet by mouth 3 times daily as needed for Pain, Disp-30 tablet, R-0Print             Diagnosis:  1. Acute bilateral low back pain without sciatica    2. Fall from ground level        Disposition:  Patient's disposition: Discharge to home  Patient's condition is stable.            Rosmery Blanco,   Resident  05/19/22 6318

## 2022-08-03 ENCOUNTER — HOSPITAL ENCOUNTER (EMERGENCY)
Age: 47
Discharge: LEFT BEFORE DISCHARGE | End: 2022-08-03

## 2022-08-03 VITALS
HEART RATE: 98 BPM | BODY MASS INDEX: 39.68 KG/M2 | RESPIRATION RATE: 20 BRPM | WEIGHT: 224 LBS | OXYGEN SATURATION: 100 % | SYSTOLIC BLOOD PRESSURE: 136 MMHG | TEMPERATURE: 97.4 F | DIASTOLIC BLOOD PRESSURE: 80 MMHG

## 2022-08-03 RX ORDER — DIAZEPAM 10 MG/1
10 TABLET ORAL EVERY 6 HOURS PRN
COMMUNITY

## 2022-08-03 ASSESSMENT — PAIN DESCRIPTION - FREQUENCY: FREQUENCY: CONTINUOUS

## 2022-08-03 ASSESSMENT — PAIN DESCRIPTION - DESCRIPTORS: DESCRIPTORS: DISCOMFORT

## 2022-08-03 ASSESSMENT — PAIN - FUNCTIONAL ASSESSMENT: PAIN_FUNCTIONAL_ASSESSMENT: 0-10

## 2022-08-03 ASSESSMENT — PAIN DESCRIPTION - PAIN TYPE: TYPE: ACUTE PAIN

## 2022-08-03 ASSESSMENT — PAIN SCALES - GENERAL: PAINLEVEL_OUTOF10: 6

## 2022-08-03 ASSESSMENT — PAIN DESCRIPTION - LOCATION: LOCATION: GENERALIZED

## 2022-08-04 NOTE — ED NOTES
Patient stated to this associate that she was leaving and going home.   Signed form and left department     Keshia Garcia Screen  08/03/22 0901

## 2022-09-01 ENCOUNTER — HOSPITAL ENCOUNTER (EMERGENCY)
Age: 47
Discharge: LEFT BEFORE DISCHARGE | End: 2022-09-01

## 2022-09-01 ENCOUNTER — APPOINTMENT (OUTPATIENT)
Dept: CT IMAGING | Age: 47
End: 2022-09-01

## 2022-09-01 VITALS
RESPIRATION RATE: 24 BRPM | TEMPERATURE: 99 F | BODY MASS INDEX: 39.68 KG/M2 | WEIGHT: 224 LBS | SYSTOLIC BLOOD PRESSURE: 141 MMHG | OXYGEN SATURATION: 98 % | DIASTOLIC BLOOD PRESSURE: 98 MMHG | HEART RATE: 100 BPM

## 2022-09-01 RX ORDER — 0.9 % SODIUM CHLORIDE 0.9 %
1000 INTRAVENOUS SOLUTION INTRAVENOUS ONCE
Status: DISCONTINUED | OUTPATIENT
Start: 2022-09-01 | End: 2022-09-01 | Stop reason: HOSPADM

## 2022-09-01 ASSESSMENT — PAIN - FUNCTIONAL ASSESSMENT: PAIN_FUNCTIONAL_ASSESSMENT: 0-10

## 2022-09-01 ASSESSMENT — PAIN DESCRIPTION - LOCATION: LOCATION: HEAD

## 2022-09-01 ASSESSMENT — PAIN SCALES - GENERAL: PAINLEVEL_OUTOF10: 6

## 2022-09-01 ASSESSMENT — PAIN DESCRIPTION - DESCRIPTORS: DESCRIPTORS: THROBBING

## 2022-09-01 ASSESSMENT — PAIN DESCRIPTION - ONSET: ONSET: SUDDEN

## 2022-09-01 ASSESSMENT — PAIN DESCRIPTION - PAIN TYPE: TYPE: ACUTE PAIN

## 2022-09-01 ASSESSMENT — PAIN DESCRIPTION - FREQUENCY: FREQUENCY: CONTINUOUS

## 2022-09-01 NOTE — ED NOTES
Pt states she doesn't want to have labwork or IV fluids.  States she is going to leave and go home and drink liquids and she has Valium at home to relax      Марина Card, DAWOOD  09/01/22 6694

## 2022-09-01 NOTE — ED NOTES
Department of Emergency Medicine  FIRST PROVIDER TRIAGE NOTE             Independent [facility]LP           9/1/22  6:07 PM EDT    Date of Encounter: 9/1/22   MRN:       HPI: Sharlene Ashley is a 41 y.o. female who presents to the ED for Migraine (Happening several times after having covid. Neck and shoulders feel \"locked up. \"  )       ROS: Negative for cp or sob. PE: Gen Appearance/Constitutional: alert  HEENT: NC/NT. PERRLA,  Airway patent. Initial Plan of Care: All treatment areas with department are currently occupied. Plan to order/Initiate the following while awaiting opening in ED: labs, EKG, and imaging studies.   Initiate Treatment-Testing, Proceed toTreatment Area When Bed Available for ED Attending/MLP to Continue Care    Electronically signed by ADELA Mendoza CNP   DD: 9/1/22       ADELA Jose CNP  09/01/22 4879

## 2022-12-23 ENCOUNTER — HOSPITAL ENCOUNTER (EMERGENCY)
Age: 47
Discharge: HOME | End: 2022-12-24
Attending: EMERGENCY MEDICINE
Payer: MEDICARE

## 2022-12-23 DIAGNOSIS — R03.0 ELEVATED BLOOD PRESSURE READING: ICD-10-CM

## 2022-12-23 DIAGNOSIS — L03.019 CELLULITIS OF MULTIPLE SITES OF HAND AND FINGERS: Primary | ICD-10-CM

## 2022-12-23 DIAGNOSIS — L03.119 CELLULITIS OF MULTIPLE SITES OF HAND AND FINGERS: Primary | ICD-10-CM

## 2022-12-23 PROCEDURE — 96374 THER/PROPH/DIAG INJ IV PUSH: CPT

## 2022-12-23 PROCEDURE — 99284 EMERGENCY DEPT VISIT MOD MDM: CPT

## 2022-12-23 PROCEDURE — 6370000000 HC RX 637 (ALT 250 FOR IP): Performed by: EMERGENCY MEDICINE

## 2022-12-23 PROCEDURE — 2580000003 HC RX 258: Performed by: EMERGENCY MEDICINE

## 2022-12-23 PROCEDURE — 6360000002 HC RX W HCPCS: Performed by: EMERGENCY MEDICINE

## 2022-12-23 RX ORDER — CLONAZEPAM 2 MG/1
2 TABLET, ORALLY DISINTEGRATING ORAL 2 TIMES DAILY PRN
COMMUNITY

## 2022-12-23 RX ORDER — HYDROCODONE BITARTRATE AND ACETAMINOPHEN 5; 325 MG/1; MG/1
2 TABLET ORAL ONCE
Status: COMPLETED | OUTPATIENT
Start: 2022-12-24 | End: 2022-12-23

## 2022-12-23 RX ADMIN — CEFTRIAXONE 1000 MG: 1 INJECTION, POWDER, FOR SOLUTION INTRAMUSCULAR; INTRAVENOUS at 23:42

## 2022-12-23 RX ADMIN — HYDROCODONE BITARTRATE AND ACETAMINOPHEN 2 TABLET: 5; 325 TABLET ORAL at 23:47

## 2022-12-23 ASSESSMENT — PAIN DESCRIPTION - LOCATION
LOCATION: HAND
LOCATION: FINGER (COMMENT WHICH ONE)

## 2022-12-23 ASSESSMENT — PAIN DESCRIPTION - ORIENTATION: ORIENTATION: LEFT;RIGHT

## 2022-12-23 ASSESSMENT — PAIN DESCRIPTION - FREQUENCY: FREQUENCY: CONTINUOUS

## 2022-12-23 ASSESSMENT — PAIN DESCRIPTION - PAIN TYPE: TYPE: ACUTE PAIN

## 2022-12-23 ASSESSMENT — PAIN SCALES - GENERAL
PAINLEVEL_OUTOF10: 6
PAINLEVEL_OUTOF10: 6

## 2022-12-23 ASSESSMENT — PAIN DESCRIPTION - DESCRIPTORS: DESCRIPTORS: BURNING;ITCHING;TENDER

## 2022-12-23 ASSESSMENT — PAIN - FUNCTIONAL ASSESSMENT: PAIN_FUNCTIONAL_ASSESSMENT: 0-10

## 2022-12-24 VITALS
BODY MASS INDEX: 42.52 KG/M2 | HEART RATE: 88 BPM | TEMPERATURE: 97.9 F | DIASTOLIC BLOOD PRESSURE: 93 MMHG | RESPIRATION RATE: 16 BRPM | WEIGHT: 240 LBS | HEIGHT: 63 IN | OXYGEN SATURATION: 96 % | SYSTOLIC BLOOD PRESSURE: 146 MMHG

## 2022-12-24 RX ORDER — SULFAMETHOXAZOLE AND TRIMETHOPRIM 800; 160 MG/1; MG/1
1 TABLET ORAL 2 TIMES DAILY
Qty: 14 TABLET | Refills: 0 | Status: SHIPPED | OUTPATIENT
Start: 2022-12-24 | End: 2022-12-31

## 2022-12-24 RX ORDER — HYDROCODONE BITARTRATE AND ACETAMINOPHEN 5; 325 MG/1; MG/1
1 TABLET ORAL EVERY 6 HOURS PRN
Qty: 10 TABLET | Refills: 0 | Status: SHIPPED | OUTPATIENT
Start: 2022-12-24 | End: 2022-12-27

## 2022-12-24 RX ORDER — BACITRACIN, NEOMYCIN, POLYMYXIN B 400; 3.5; 5 [USP'U]/G; MG/G; [USP'U]/G
OINTMENT TOPICAL
Qty: 30 G | Refills: 0 | Status: SHIPPED | OUTPATIENT
Start: 2022-12-24 | End: 2023-01-03

## 2022-12-24 ASSESSMENT — PAIN SCALES - GENERAL: PAINLEVEL_OUTOF10: 3

## 2022-12-24 NOTE — ED PROVIDER NOTES
HPI:  12/23/22, Time: 11:24 PM ZHANNA Chávez is a 41 y.o. female presenting to the ED for ***, beginning *** ago. The complaint has been {Desc; intermittent/persistent/constant:76152}, {DESC; MILD/MOD/SEVERE:13894} in severity, and worsened by {Modify factor:87929}. ***    Review of Systems:   A complete review of systems was performed and pertinent positives and negatives are stated within HPI, all other systems reviewed and are negative.    --------------------------------------------- PAST HISTORY ---------------------------------------------  Past Medical History:  has a past medical history of Anxiety, Bipolar 1 disorder ([facility] [address] Preeclampsia, and PTSD (post-traumatic stress disorder). Past Surgical History:  has a past surgical history that includes other surgical history. Social History:  reports that she has never smoked. She has never used smokeless tobacco. She reports current alcohol use. She reports that she does not use drugs. Family History: family history is not on file. The patients home medications have been reviewed. Allergies: Prednisone    -------------------------------------------------- RESULTS -------------------------------------------------  All laboratory and radiology results have been personally reviewed by myself   LABS:  No results found for this visit on 12/23/22. RADIOLOGY:  Interpreted by Radiologist.  No orders to display     ------------------------- NURSING NOTES AND VITALS REVIEWED ---------------------------    The nursing notes within the ED encounter and vital signs as below have been reviewed.    BP (!) 150/71   Pulse 98   Temp 97.9 °F (36.6 °C) (Oral)   Resp 18   Ht 5' 3\" (1.6 m)   Wt 240 lb (108.9 kg)   SpO2 97%   BMI 42.51 kg/m²   Oxygen Saturation Interpretation: Normal      ---------------------------------------------------PHYSICAL EXAM--------------------------------------      Constitutional/General: Alert and oriented x3, well appearing, non toxic in NAD  Head: Normocephalic and atraumatic  Eyes: PERRL, EOMI  Mouth: Oropharynx clear, handling secretions, no trismus  Neck: Supple, full ROM,   Pulmonary: Lungs clear to auscultation bilaterally, no wheezes, rales, or rhonchi. Not in respiratory distress  Cardiovascular:  Regular rate and rhythm, no murmurs, gallops, or rubs. 2+ distal pulses  Abdomen: Soft, non tender, non distended,   Extremities: Moves all extremities x 4. Warm and well perfused  Skin: warm and dry without rash  Neurologic: GCS 15,  Psych: Normal Affect      ------------------------------ ED COURSE/MEDICAL DECISION MAKING----------------------  Medications   cefTRIAXone (ROCEPHIN) 1,000 mg in sterile water 10 mL IV syringe (1,000 mg IntraVENous Given 12/23/22 2342)         ED COURSE:       Medical Decision Making:    ***    Counseling: The emergency provider has spoken with the patient and discussed todays results, in addition to providing specific details for the plan of care and counseling regarding the diagnosis and prognosis. Questions are answered at this time and they are agreeable with the plan.      --------------------------------- IMPRESSION AND DISPOSITION ---------------------------------    IMPRESSION  No diagnosis found. DISPOSITION  Disposition: Discharge to home  Patient condition is stable      NOTE: This report was transcribed using voice recognition software.  Every effort was made to ensure accuracy; however, inadvertent computerized transcription errors may be present antibiotic regimen. She did receive Rocephin 1 g IV here in the ED and we have changed her antibiotic regimen to Bactrim DS 1 p.o. twice daily in conjunction with Keflex which she is already taking. Patient was given referral to her PCP for outpatient follow-up. She understands she is to get immediate medical attention with any new/worsening symptoms occur. She is also advised to see her primary care physician for blood pressure recheck within 3 to 4 days. Counseling: The emergency provider has spoken with the patient and discussed todays results, in addition to providing specific details for the plan of care and counseling regarding the diagnosis and prognosis. Questions are answered at this time and they are agreeable with the plan.      --------------------------------- IMPRESSION AND DISPOSITION ---------------------------------    IMPRESSION  1. Cellulitis of multiple sites of hand and fingers    2. Elevated blood pressure reading        DISPOSITION  Disposition: Discharge to home  Patient condition is stable      NOTE: This report was transcribed using voice recognition software.  Every effort was made to ensure accuracy; however, inadvertent computerized transcription errors may be present        Manny Aguilar MD  01/12/23 2010

## 2022-12-24 NOTE — DISCHARGE INSTRUCTIONS
NOTE:  As we discussed, stop taking the doxycycline which was prescribed. This antibiotic is being replaced with BACTRIM (a Sulfa antibiotic). The combination of Bactrim plus Keflex will be effective for treatment of suspected MRSA if present. Apply antibiotic ointment to skin wounds 2-3 times daily. Make sure to see your family doctor in 4 days for wound recheck.

## 2023-09-06 ENCOUNTER — HOSPITAL ENCOUNTER (EMERGENCY)
Age: 48
Discharge: HOME | End: 2023-09-06

## 2023-09-06 VITALS
DIASTOLIC BLOOD PRESSURE: 86 MMHG | HEART RATE: 102 BPM | OXYGEN SATURATION: 98 % | RESPIRATION RATE: 14 BRPM | SYSTOLIC BLOOD PRESSURE: 154 MMHG | TEMPERATURE: 97.4 F

## 2023-09-06 NOTE — ED NOTES
1st attempt to call patient to room at 1105, no repsonse. Lulu LINTONFRANCA stated she thinks she might have left. 2nd attempt to call patient [phone], no response. Patient moved to  off the floor. RN [id number] ADKINS notified.       Jewels Borden  09/06/23 1113

## 2024-05-10 ENCOUNTER — HOSPITAL ENCOUNTER (EMERGENCY)
Age: 49
Discharge: HOME | End: 2024-05-10
Payer: MEDICARE

## 2024-05-10 VITALS
WEIGHT: 260 LBS | HEART RATE: 87 BPM | BODY MASS INDEX: 47.84 KG/M2 | TEMPERATURE: 98.4 F | OXYGEN SATURATION: 97 % | SYSTOLIC BLOOD PRESSURE: 140 MMHG | HEIGHT: 62 IN | RESPIRATION RATE: 18 BRPM | DIASTOLIC BLOOD PRESSURE: 62 MMHG

## 2024-05-10 DIAGNOSIS — M54.31 SCIATICA OF RIGHT SIDE: Primary | ICD-10-CM

## 2024-05-10 PROCEDURE — 99211 OFF/OP EST MAY X REQ PHY/QHP: CPT

## 2024-05-10 PROCEDURE — 6360000002 HC RX W HCPCS: Performed by: NURSE PRACTITIONER

## 2024-05-10 PROCEDURE — 96372 THER/PROPH/DIAG INJ SC/IM: CPT

## 2024-05-10 RX ORDER — KETOROLAC TROMETHAMINE 30 MG/ML
30 INJECTION, SOLUTION INTRAMUSCULAR; INTRAVENOUS ONCE
Status: COMPLETED | OUTPATIENT
Start: 2024-05-10 | End: 2024-05-10

## 2024-05-10 RX ORDER — DEXAMETHASONE SODIUM PHOSPHATE 10 MG/ML
10 INJECTION, SOLUTION INTRAMUSCULAR; INTRAVENOUS ONCE
Status: COMPLETED | OUTPATIENT
Start: 2024-05-10 | End: 2024-05-10

## 2024-05-10 RX ADMIN — DEXAMETHASONE SODIUM PHOSPHATE 10 MG: 10 INJECTION INTRAMUSCULAR; INTRAVENOUS at 12:27

## 2024-05-10 RX ADMIN — KETOROLAC TROMETHAMINE 30 MG: 30 INJECTION, SOLUTION INTRAMUSCULAR at 12:28

## 2024-05-10 ASSESSMENT — PAIN DESCRIPTION - ORIENTATION: ORIENTATION: RIGHT

## 2024-05-10 ASSESSMENT — PAIN SCALES - GENERAL: PAINLEVEL_OUTOF10: 7

## 2024-05-10 ASSESSMENT — PAIN DESCRIPTION - LOCATION: LOCATION: BACK

## 2024-05-10 ASSESSMENT — PAIN - FUNCTIONAL ASSESSMENT: PAIN_FUNCTIONAL_ASSESSMENT: 0-10

## 2024-05-10 NOTE — ED PROVIDER NOTES
Respiratory: Not in respiratory distress  CV:  Regular rate.   Musculoskeletal: Moves all extremities x 4.  Normal gait normal strength in the lower extremities no edema noted integument: skin warm and dry. No rashes.   Lymphatic: no lymphadenopathy noted  Neurologic: GCS 15, no focal deficits,   Psychiatric: Normal Affect    Lab / Imaging Results   (All laboratory and radiology results have been personally reviewed by myself)  Labs:  No results found for this visit on 05/10/24.  Imaging:  All Radiology results interpreted by Radiologist unless otherwise noted.  No orders to display       ED Course / Medical Decision Making     Medications   ketorolac (TORADOL) injection 30 mg (has no administration in time range)   dexAMETHasone (DECADRON) Oral 10 mg (has no administration in time range)          Consult(s):   None        MDM:   She has a flareup of sciatica on the right buttock and down the right back of the right leg she has had this before.  She has been seeing her chiropractor for but not getting relief.  She was given a dose of Toradol now and also a dose of Decadron and advised to continue her meds at home she is on Valium for muscle relaxer also.  I advised her to follow-up with her PCP      Assessment      1. Sciatica of right side      Plan   Discharge to home and advised to contact [facility]  [address] [place] [address]  [phone]    Schedule an appointment as soon as possible for a visit      Patient condition is good    New Medications     New Prescriptions    No medications on file     Electronically signed by ADELA Garcia CNP   DD: 5/10/24  **This report was transcribed using voice recognition software. Every effort was made to ensure accuracy; however, inadvertent computerized transcription errors may be present.  END OF ED PROVIDER NOTE       Thea Roberto APRN - CNP  05/10/24 7374

## 2024-05-20 VITALS
HEART RATE: 120 BPM | HEIGHT: 62 IN | BODY MASS INDEX: 49.69 KG/M2 | RESPIRATION RATE: 18 BRPM | TEMPERATURE: 97.8 F | DIASTOLIC BLOOD PRESSURE: 75 MMHG | WEIGHT: 270 LBS | SYSTOLIC BLOOD PRESSURE: 138 MMHG | OXYGEN SATURATION: 96 %

## 2024-05-20 LAB
SPECIMEN SOURCE: NORMAL
STREP A, MOLECULAR: NEGATIVE

## 2024-05-20 PROCEDURE — 87651 STREP A DNA AMP PROBE: CPT

## 2024-05-20 PROCEDURE — 4500000002 HC ER NO CHARGE

## 2024-05-20 ASSESSMENT — PAIN DESCRIPTION - LOCATION: LOCATION: THROAT;GENERALIZED

## 2024-05-20 ASSESSMENT — LIFESTYLE VARIABLES
HOW MANY STANDARD DRINKS CONTAINING ALCOHOL DO YOU HAVE ON A TYPICAL DAY: PATIENT DOES NOT DRINK
HOW OFTEN DO YOU HAVE A DRINK CONTAINING ALCOHOL: NEVER

## 2024-05-20 ASSESSMENT — PAIN SCALES - GENERAL: PAINLEVEL_OUTOF10: 10

## 2024-05-20 ASSESSMENT — PAIN - FUNCTIONAL ASSESSMENT
PAIN_FUNCTIONAL_ASSESSMENT: 0-10
PAIN_FUNCTIONAL_ASSESSMENT: ACTIVITIES ARE NOT PREVENTED

## 2024-05-20 ASSESSMENT — PAIN DESCRIPTION - ONSET: ONSET: ON-GOING

## 2024-05-20 ASSESSMENT — PAIN DESCRIPTION - FREQUENCY: FREQUENCY: CONTINUOUS

## 2024-05-20 ASSESSMENT — PAIN DESCRIPTION - DESCRIPTORS: DESCRIPTORS: ACHING

## 2024-05-21 ENCOUNTER — APPOINTMENT (OUTPATIENT)
Dept: GENERAL RADIOLOGY | Age: 49
End: 2024-05-21
Payer: MEDICARE

## 2024-05-21 ENCOUNTER — HOSPITAL ENCOUNTER (EMERGENCY)
Age: 49
Discharge: LEFT BEFORE DISCHARGE | End: 2024-05-21
Payer: MEDICARE

## 2024-07-24 ENCOUNTER — HOSPITAL ENCOUNTER (EMERGENCY)
Age: 49
Discharge: HOME | End: 2024-07-24
Payer: COMMERCIAL

## 2024-07-24 ENCOUNTER — APPOINTMENT (OUTPATIENT)
Dept: GENERAL RADIOLOGY | Age: 49
End: 2024-07-24
Payer: COMMERCIAL

## 2024-07-24 VITALS
TEMPERATURE: 97.4 F | OXYGEN SATURATION: 99 % | WEIGHT: 267.6 LBS | RESPIRATION RATE: 14 BRPM | HEART RATE: 100 BPM | DIASTOLIC BLOOD PRESSURE: 82 MMHG | BODY MASS INDEX: 48.94 KG/M2 | SYSTOLIC BLOOD PRESSURE: 162 MMHG

## 2024-07-24 DIAGNOSIS — S76.011A STRAIN OF RIGHT HIP, INITIAL ENCOUNTER: ICD-10-CM

## 2024-07-24 DIAGNOSIS — M25.551 RIGHT HIP PAIN: Primary | ICD-10-CM

## 2024-07-24 PROCEDURE — 72100 X-RAY EXAM L-S SPINE 2/3 VWS: CPT

## 2024-07-24 PROCEDURE — 6370000000 HC RX 637 (ALT 250 FOR IP): Performed by: PHYSICIAN ASSISTANT

## 2024-07-24 PROCEDURE — 99283 EMERGENCY DEPT VISIT LOW MDM: CPT

## 2024-07-24 PROCEDURE — 73502 X-RAY EXAM HIP UNI 2-3 VIEWS: CPT

## 2024-07-24 RX ORDER — CYCLOBENZAPRINE HCL 10 MG
10 TABLET ORAL 3 TIMES DAILY PRN
Qty: 21 TABLET | Refills: 0 | Status: SHIPPED | OUTPATIENT
Start: 2024-07-24 | End: 2024-08-03

## 2024-07-24 RX ORDER — HYDROCODONE BITARTRATE AND ACETAMINOPHEN 5; 325 MG/1; MG/1
1 TABLET ORAL EVERY 6 HOURS PRN
Qty: 12 TABLET | Refills: 0 | Status: SHIPPED | OUTPATIENT
Start: 2024-07-24 | End: 2024-07-27

## 2024-07-24 RX ORDER — DIAZEPAM 10 MG/1
10 TABLET ORAL EVERY 6 HOURS PRN
COMMUNITY

## 2024-07-24 RX ORDER — OXYCODONE HYDROCHLORIDE AND ACETAMINOPHEN 5; 325 MG/1; MG/1
1 TABLET ORAL ONCE
Status: COMPLETED | OUTPATIENT
Start: 2024-07-24 | End: 2024-07-24

## 2024-07-24 RX ADMIN — OXYCODONE HYDROCHLORIDE AND ACETAMINOPHEN 1 TABLET: 5; 325 TABLET ORAL at 22:12

## 2024-07-25 NOTE — ED NOTES
Name: Tala Merino  : 1975  MRN: [id number]    Date: 2024    Benefits of immediately proceeding with Radiology exam outweigh the risks and therefore the following is being waived:      [x] Pregnancy test    [] Protocol for Iodine allergy    [] MRI questionnaire    [] BUN/Creatinine        JESSIE Villafuerte Cindy A, PA-C  24 [phone]

## 2024-07-25 NOTE — ED PROVIDER NOTES
Independent [facility]ERRER[facility] Visit.           [facility] EMERGENCY DEPARTMENT  ED  Encounter Note  Admit Date/RoomTime: 2024  9:38 PM  ED Room:   NAME: Tala Merino  : 1975  MRN: [id number]  PCP: [facility]    CHIEF COMPLAINT     Hip Pain (RIGHT HIP; was adjusted at the chiropractor on Monday and pain since then; pain with walking )    HISTORY OF PRESENT ILLNESS        Tala Merino is a 49 y.o. female who presents to the ED by private vehicle for right hip pain, beginning 2 day(s) ago. The complaint has been persistent and are moderate in severity.  The patient states she was not having any hip or back pain when she went to her chiropractor on Monday for what she states was a \"regular adjustment\" she states that he did something different where he put her leg between his knees and kind of pulled on it real hard.  She states that when she went to walk up and out of the office she noted some pain in the right hip.  This is gotten progressively worse over the last few days to where she can barely put any weight on the extremity.  Reports no pain with sitting or laying.   Denies right knee pain.   States pain is located over posterolateral hip.   Does not radiated.   States it feels like right hip is higher now than left.       REVIEW OF SYSTEMS     Pertinent positives and negatives are stated within HPI, all other systems reviewed and are negative.    Past Medical History:  has a past medical history of Anxiety, Bipolar 1 disorder ([facility]), Preeclampsia, and PTSD (post-traumatic stress disorder).  Surgical History:  has a past surgical history that includes other surgical history.  Social History:  reports that she has never smoked. She has never used smokeless tobacco. She reports current alcohol use. She reports that she does not use drugs.  Family History: family history is not on file.   Allergies: Prednisone  CURRENT MEDICATIONS       Previous Medications    CLONIDINE